# Patient Record
Sex: MALE | Race: BLACK OR AFRICAN AMERICAN | NOT HISPANIC OR LATINO | Employment: FULL TIME | URBAN - METROPOLITAN AREA
[De-identification: names, ages, dates, MRNs, and addresses within clinical notes are randomized per-mention and may not be internally consistent; named-entity substitution may affect disease eponyms.]

---

## 2024-04-01 ENCOUNTER — OFFICE VISIT (OUTPATIENT)
Dept: PRIMARY CARE | Facility: CLINIC | Age: 49
End: 2024-04-01
Payer: COMMERCIAL

## 2024-04-01 VITALS
WEIGHT: 175 LBS | HEIGHT: 67 IN | OXYGEN SATURATION: 97 % | SYSTOLIC BLOOD PRESSURE: 149 MMHG | DIASTOLIC BLOOD PRESSURE: 92 MMHG | RESPIRATION RATE: 12 BRPM | BODY MASS INDEX: 27.47 KG/M2 | HEART RATE: 71 BPM

## 2024-04-01 DIAGNOSIS — E78.00 ELEVATED LDL CHOLESTEROL LEVEL: ICD-10-CM

## 2024-04-01 DIAGNOSIS — E03.9 HYPOTHYROIDISM, UNSPECIFIED TYPE: Primary | ICD-10-CM

## 2024-04-01 DIAGNOSIS — K64.9 HEMORRHOIDS, UNSPECIFIED HEMORRHOID TYPE: ICD-10-CM

## 2024-04-01 DIAGNOSIS — Z00.00 ENCOUNTER FOR ANNUAL PHYSICAL EXAM: ICD-10-CM

## 2024-04-01 DIAGNOSIS — I10 BENIGN ESSENTIAL HYPERTENSION: ICD-10-CM

## 2024-04-01 DIAGNOSIS — E78.5 HYPERLIPIDEMIA, UNSPECIFIED HYPERLIPIDEMIA TYPE: ICD-10-CM

## 2024-04-01 PROBLEM — L29.0 ANAL ITCHING: Status: ACTIVE | Noted: 2024-04-01

## 2024-04-01 PROBLEM — B35.1 ONYCHOMYCOSIS OF TOENAIL: Status: ACTIVE | Noted: 2024-04-01

## 2024-04-01 PROBLEM — L73.0 ACNE KELOID: Status: ACTIVE | Noted: 2019-06-25

## 2024-04-01 PROBLEM — J31.0 RHINITIS: Status: ACTIVE | Noted: 2024-04-01

## 2024-04-01 PROBLEM — L21.8 OTHER SEBORRHEIC DERMATITIS: Status: ACTIVE | Noted: 2019-06-25

## 2024-04-01 PROBLEM — J03.90 TONSILLITIS: Status: ACTIVE | Noted: 2024-04-01

## 2024-04-01 PROBLEM — D50.9 IRON DEFICIENCY ANEMIA: Status: ACTIVE | Noted: 2024-04-01

## 2024-04-01 LAB
25(OH)D3 SERPL-MCNC: 12 NG/ML (ref 30–100)
ALBUMIN SERPL BCP-MCNC: 4.4 G/DL (ref 3.4–5)
ALP SERPL-CCNC: 45 U/L (ref 33–120)
ALT SERPL W P-5'-P-CCNC: 13 U/L (ref 10–52)
ANION GAP SERPL CALC-SCNC: 19 MMOL/L (ref 10–20)
AST SERPL W P-5'-P-CCNC: 25 U/L (ref 9–39)
BILIRUB SERPL-MCNC: 0.7 MG/DL (ref 0–1.2)
BUN SERPL-MCNC: 11 MG/DL (ref 6–23)
CALCIUM SERPL-MCNC: 9.6 MG/DL (ref 8.6–10.6)
CHLORIDE SERPL-SCNC: 99 MMOL/L (ref 98–107)
CHOLEST SERPL-MCNC: 235 MG/DL (ref 0–199)
CHOLESTEROL/HDL RATIO: 2.7
CO2 SERPL-SCNC: 25 MMOL/L (ref 21–32)
CREAT SERPL-MCNC: 0.99 MG/DL (ref 0.5–1.3)
EGFRCR SERPLBLD CKD-EPI 2021: >90 ML/MIN/1.73M*2
ERYTHROCYTE [DISTWIDTH] IN BLOOD BY AUTOMATED COUNT: 12.6 % (ref 11.5–14.5)
GLUCOSE SERPL-MCNC: 61 MG/DL (ref 74–99)
HCT VFR BLD AUTO: 54.8 % (ref 41–52)
HDLC SERPL-MCNC: 87.2 MG/DL
HGB BLD-MCNC: 16.7 G/DL (ref 13.5–17.5)
LDLC SERPL CALC-MCNC: 133 MG/DL
MCH RBC QN AUTO: 28.7 PG (ref 26–34)
MCHC RBC AUTO-ENTMCNC: 30.5 G/DL (ref 32–36)
MCV RBC AUTO: 94 FL (ref 80–100)
NON HDL CHOLESTEROL: 148 MG/DL (ref 0–149)
NRBC BLD-RTO: 0 /100 WBCS (ref 0–0)
PLATELET # BLD AUTO: 200 X10*3/UL (ref 150–450)
POTASSIUM SERPL-SCNC: 3.9 MMOL/L (ref 3.5–5.3)
PROT SERPL-MCNC: 7.4 G/DL (ref 6.4–8.2)
PSA SERPL-MCNC: 1.29 NG/ML
RBC # BLD AUTO: 5.81 X10*6/UL (ref 4.5–5.9)
SODIUM SERPL-SCNC: 139 MMOL/L (ref 136–145)
TRIGL SERPL-MCNC: 72 MG/DL (ref 0–149)
TSH SERPL-ACNC: 2.21 MIU/L (ref 0.44–3.98)
VLDL: 14 MG/DL (ref 0–40)
WBC # BLD AUTO: 7.1 X10*3/UL (ref 4.4–11.3)

## 2024-04-01 PROCEDURE — 82306 VITAMIN D 25 HYDROXY: CPT

## 2024-04-01 PROCEDURE — 3077F SYST BP >= 140 MM HG: CPT | Performed by: INTERNAL MEDICINE

## 2024-04-01 PROCEDURE — 84443 ASSAY THYROID STIM HORMONE: CPT

## 2024-04-01 PROCEDURE — 85027 COMPLETE CBC AUTOMATED: CPT

## 2024-04-01 PROCEDURE — 36415 COLL VENOUS BLD VENIPUNCTURE: CPT

## 2024-04-01 PROCEDURE — 80053 COMPREHEN METABOLIC PANEL: CPT

## 2024-04-01 PROCEDURE — 3080F DIAST BP >= 90 MM HG: CPT | Performed by: INTERNAL MEDICINE

## 2024-04-01 PROCEDURE — 1036F TOBACCO NON-USER: CPT | Performed by: INTERNAL MEDICINE

## 2024-04-01 PROCEDURE — 99204 OFFICE O/P NEW MOD 45 MIN: CPT | Performed by: INTERNAL MEDICINE

## 2024-04-01 PROCEDURE — 84153 ASSAY OF PSA TOTAL: CPT

## 2024-04-01 PROCEDURE — 80061 LIPID PANEL: CPT

## 2024-04-01 RX ORDER — HYDROCORTISONE 25 MG/G
CREAM TOPICAL 4 TIMES DAILY PRN
Qty: 30 G | Refills: 0 | Status: SHIPPED | OUTPATIENT
Start: 2024-04-01

## 2024-04-01 ASSESSMENT — ENCOUNTER SYMPTOMS
DEPRESSION: 0
OCCASIONAL FEELINGS OF UNSTEADINESS: 0
LOSS OF SENSATION IN FEET: 0

## 2024-04-01 NOTE — ASSESSMENT & PLAN NOTE
Pt endorses systolic BP in the 120's at home. Describes white coat hypertension. Will evaluate BP at next visit and create further plan from there.

## 2024-04-01 NOTE — PROGRESS NOTES
"Subjective   Chief complaint: Connor Landon is a 48 y.o. male who presents for New Patient Visit (NPV is here to establish care, pt c/o for a couple weeks now having sore rectum ).    HPI:  48 y.o. male here to establish care  Complains of tingling, itchy sensation in rectum. Denies blood, pain.  No history of any chronic illness.   Working out 3 times per week.   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             Objective   BP (!) 149/92   Pulse 71   Resp 12   Ht 1.702 m (5' 7\")   Wt 79.4 kg (175 lb)   SpO2 97%   BMI 27.41 kg/m²   Physical Exam  HENT:      Head: Normocephalic and atraumatic.      Mouth/Throat:      Mouth: Mucous membranes are moist.      Pharynx: Oropharynx is clear.   Eyes:      Pupils: Pupils are equal, round, and reactive to light.   Cardiovascular:      Rate and Rhythm: Normal rate and regular rhythm.      Pulses: Normal pulses.      Heart sounds: Normal heart sounds.   Pulmonary:      Effort: Pulmonary effort is normal. No respiratory distress.   Abdominal:      General: Abdomen is flat. There is no distension.      Tenderness: There is no abdominal tenderness.   Genitourinary:     Comments: Rectal exam performed with chaperone. Internal and external hemorrhoids identified. No erythema, pus, swelling.    Musculoskeletal:         General: Normal range of motion.      Cervical back: Normal range of motion.   Neurological:      General: No focal deficit present.      Mental Status: He is alert.         I have reviewed and reconciled the medication list with the patient today. No current outpatient medications on file.     Imaging:  No results found.     Labs reviewed:    Lab Results   Component Value Date    WBC 7.2 12/21/2020    HGB 15.8 12/21/2020    HCT 49.4 12/21/2020     12/21/2020    CHOL 227 (H) 12/21/2020    TRIG 94 12/21/2020    HDL 61.1 12/21/2020    ALT 11 12/21/2020    AST 25 12/21/2020     12/21/2020    K 4.1 12/21/2020    CL " 100 12/21/2020    CREATININE 1.13 12/21/2020    BUN 13 12/21/2020    CO2 29 12/21/2020       Assessment/Plan   Problem List Items Addressed This Visit          Cardiac and Vasculature    Hyperlipidemia     Lab obtained today for review at next appointment.          Relevant Orders    Lipid Panel    Benign essential hypertension     Pt endorses systolic BP in the 120's at home. Describes white coat hypertension. Will evaluate BP at next visit and create further plan from there.          Elevated LDL cholesterol level     Lab obtained today for review at next appointment.             Endocrine/Metabolic    Hypothyroidism - Primary     Lab obtained today for review at next appointment.             Gastrointestinal and Abdominal    Hemorrhoids     Anusol HC sent to pharmacy to be used three times per day for one week             Health Encounters    Encounter for annual physical exam     Revisit office for further establishment of care         Relevant Orders    CBC    Comprehensive Metabolic Panel    Lipid Panel    Thyroid Stimulating Hormone    Vitamin D 25-Hydroxy,Total (for eval of Vitamin D levels)    Prostate Specific Antigen, Screen       Follow up in 1-2 weeks for establishment of care including review of labs obtained today.

## 2024-04-11 ENCOUNTER — APPOINTMENT (OUTPATIENT)
Dept: PRIMARY CARE | Facility: CLINIC | Age: 49
End: 2024-04-11
Payer: COMMERCIAL

## 2024-04-24 ENCOUNTER — APPOINTMENT (OUTPATIENT)
Dept: PRIMARY CARE | Facility: CLINIC | Age: 49
End: 2024-04-24
Payer: COMMERCIAL

## 2025-02-19 ENCOUNTER — APPOINTMENT (OUTPATIENT)
Dept: UROLOGY | Facility: CLINIC | Age: 50
End: 2025-02-19
Payer: COMMERCIAL

## 2025-03-12 ENCOUNTER — APPOINTMENT (OUTPATIENT)
Dept: UROLOGY | Facility: CLINIC | Age: 50
End: 2025-03-12
Payer: COMMERCIAL

## 2025-03-30 NOTE — PROGRESS NOTES
History of Present Illness (HPI):  TODAY: (03/30/25)  Connor Landon is a 49 y.o. male with history of ***    Past Medical History:   Diagnosis Date    Contact with and (suspected) exposure to infections with a predominantly sexual mode of transmission 02/25/2014    Exposure to STD    Disorder of the skin and subcutaneous tissue, unspecified 09/06/2014    Skin lesion    Elevated blood-pressure reading, without diagnosis of hypertension 12/11/2015    Blood pressure elevated without history of HTN    Encounter for screening for infections with a predominantly sexual mode of transmission 01/23/2016    Screen for STD (sexually transmitted disease)    Personal history of other diseases of male genital organs 12/11/2015    History of prostatitis    Unspecified injury of unspecified wrist, hand and finger(s), initial encounter 12/16/2014    Hand injury     No past surgical history on file.  Family History   Problem Relation Name Age of Onset    Diabetes Father      Heart disease Brother       Social History     Tobacco Use   Smoking Status Former    Types: Cigarettes   Smokeless Tobacco Never     Current Outpatient Medications   Medication Sig Dispense Refill    hydrocortisone (Anusol-HC) 2.5 % rectal cream Insert into the rectum 4 times a day as needed for hemorrhoids (rectal discomfort). Apply to affected areas 30 g 0     No current facility-administered medications for this visit.     No Known Allergies  Past medical, surgical, family and social history in the chart was reviewed and is accurate including any additions to what is in this HPI.    Review of systems (ROS):   Pertinent information as listed in the HPI.      Objective   There were no vitals taken for this visit.  Physical Exam:  Constitutional: NAD  HEENT: AT/NC  Resp: Non labored respirations.  Skin: No jaundice or visible skin lesions.  Neuro: No focal deficits.  Psych: Appropriate mood and affect.    Lab Review:  Lab Results   Component Value Date    WBC  "7.1 04/01/2024    RBC 5.81 04/01/2024    HGB 16.7 04/01/2024    HCT 54.8 (H) 04/01/2024     04/01/2024      Lab Results   Component Value Date    BUN 11 04/01/2024    CREATININE 0.99 04/01/2024      No results found for: \"PSA\", \"HGBA1C\"  Lab Results   Component Value Date    CHOL 235 (H) 04/01/2024    TRIG 72 04/01/2024    HDL 87.2 04/01/2024    ALT 13 04/01/2024    AST 25 04/01/2024     04/01/2024    K 3.9 04/01/2024    CL 99 04/01/2024    CO2 25 04/01/2024    TSH 2.21 04/01/2024        ASSESSMENT:  Problem List Items Addressed This Visit    None     PLAN:  ***    All questions were answered to the patient’s satisfaction.  Patient agrees with the plan and wishes to proceed.  Continue follow-up for ongoing care of *** chronic medical conditions.    Scribed for Dr. Lexii Boogie by Kaylyn Nina.  I, Dr. Lexii Boogie, have personally reviewed and agreed with the information entered by the Virtual Scribe. 03/30/25   " inhibitor use should he seek urgent medical attention for any reason.     I explained the common adverse effects of therapy including but not limited to headache, flushing, dizziness, rash, upset stomach, diarrhea, nasal congestion, abnormal vision, back pain, and myalgia. Less common side effects are lightheadedness or blood pressure drop upon standing. We discussed that patients have  after using medications in this class, and sometimes the exact cause of death was not able to be determined. There is a very small risk of nonarteritic ischemic optic neuropathy, a rare cause of blindness that may be permanent while using medications in this class. Patient is instructed to immediately stop this medication and seek medical attention if he develops visual disturbances in one or both eyes.     We discussed that if he experiences erection lasting longer than four hours, he needs to seek immediate treatment at the ER as the longer he waits, the more damage is done to the penile tissue. The patient states that he is not withholding any information about his condition or the use of medications in order to receive a PDE5 inhibitor class medication. No barriers to learning were identified. After all of the patients' questions were satisfactorily answered, he expressed understanding the risks of therapy and wishes to proceed.     All side-effects were discussed.  He would like to trial this.  Rx provided.   Refer to Dr. Vasquez    All questions were answered to the patient’s satisfaction.  Patient agrees with the plan and wishes to proceed.    Scribed for Dr. Lexii Boogie by Kaylyn Nina.  I, Dr. Lexii Boogie, have personally reviewed and agreed with the information entered by the Virtual Scribe. 25

## 2025-03-31 ENCOUNTER — APPOINTMENT (OUTPATIENT)
Dept: UROLOGY | Facility: CLINIC | Age: 50
End: 2025-03-31
Payer: COMMERCIAL

## 2025-03-31 VITALS — BODY MASS INDEX: 27.41 KG/M2 | TEMPERATURE: 97.7 F | HEIGHT: 67 IN

## 2025-03-31 DIAGNOSIS — Z13.6 ENCOUNTER FOR LIPID SCREENING FOR CARDIOVASCULAR DISEASE: ICD-10-CM

## 2025-03-31 DIAGNOSIS — Z13.1 SCREENING FOR DIABETES MELLITUS (DM): ICD-10-CM

## 2025-03-31 DIAGNOSIS — Z13.220 ENCOUNTER FOR LIPID SCREENING FOR CARDIOVASCULAR DISEASE: ICD-10-CM

## 2025-03-31 DIAGNOSIS — N52.9 ERECTILE DYSFUNCTION, UNSPECIFIED ERECTILE DYSFUNCTION TYPE: Primary | ICD-10-CM

## 2025-03-31 DIAGNOSIS — Z00.00 HEALTH MAINTENANCE EXAMINATION: ICD-10-CM

## 2025-03-31 LAB
POC APPEARANCE, URINE: CLEAR
POC BILIRUBIN, URINE: NEGATIVE
POC BLOOD, URINE: NEGATIVE
POC COLOR, URINE: YELLOW
POC GLUCOSE, URINE: NEGATIVE MG/DL
POC KETONES, URINE: NEGATIVE MG/DL
POC LEUKOCYTES, URINE: NEGATIVE
POC NITRITE,URINE: NEGATIVE
POC PH, URINE: 7 PH
POC PROTEIN, URINE: NEGATIVE MG/DL
POC SPECIFIC GRAVITY, URINE: 1.02
POC UROBILINOGEN, URINE: 0.2 EU/DL

## 2025-03-31 PROCEDURE — 99204 OFFICE O/P NEW MOD 45 MIN: CPT | Performed by: UROLOGY

## 2025-03-31 PROCEDURE — 1036F TOBACCO NON-USER: CPT | Performed by: UROLOGY

## 2025-03-31 PROCEDURE — 81003 URINALYSIS AUTO W/O SCOPE: CPT | Performed by: UROLOGY

## 2025-03-31 RX ORDER — TADALAFIL 10 MG/1
10 TABLET ORAL DAILY PRN
Qty: 30 TABLET | Refills: 6 | Status: SHIPPED | OUTPATIENT
Start: 2025-03-31 | End: 2026-03-31

## 2025-03-31 ASSESSMENT — PAIN SCALES - GENERAL: PAINLEVEL_OUTOF10: 0-NO PAIN

## 2025-04-15 ENCOUNTER — APPOINTMENT (OUTPATIENT)
Dept: UROLOGY | Facility: CLINIC | Age: 50
End: 2025-04-15
Payer: COMMERCIAL

## 2025-04-15 DIAGNOSIS — N52.9 ERECTILE DYSFUNCTION, UNSPECIFIED ERECTILE DYSFUNCTION TYPE: ICD-10-CM

## 2025-04-15 DIAGNOSIS — F43.22 ADJUSTMENT DISORDER WITH ANXIOUS MOOD: Primary | ICD-10-CM

## 2025-04-15 PROCEDURE — 90791 PSYCH DIAGNOSTIC EVALUATION: CPT | Performed by: PSYCHOLOGIST

## 2025-04-24 PROBLEM — N52.9 ERECTILE DYSFUNCTION: Status: ACTIVE | Noted: 2025-04-24

## 2025-04-24 PROBLEM — F43.22 ADJUSTMENT DISORDER WITH ANXIOUS MOOD: Status: ACTIVE | Noted: 2025-04-24

## 2025-04-24 NOTE — PROGRESS NOTES
"Outpatient Psychology Initial Appointment  Subjective   Connor Landon, a 49 y.o. male, for initial evaluation visit. Participated in diagnostic interview and identification of goals for treatment.      Patient is referred by Lexii Boogie MD.      Reason:  He has been experiencing distress due to difficulties in his sexual relationship.      Psychosocial History Connor is currently seeking psychological/sex therapy under the recommendation of his urologist.  Connor states that he has been having difficulties with erectile functioning, often experiencing a \"partial erection\" during partnered sexual engagement. He was given a prescription of Tadalafil at 10mg prn.  He has considered that there are psychological factors to his difficulties.  Connor states that he has been in a newer relationship of 2 years, having been  previously and now a . His wife passed away in 2021 after being together for 20 years and  for 17 of those years.  He stated that they have three children, ages 31, 22, and 20 years of age.  He identified her as a \"good partner\", and that he went through a period of grief and loss following her death.  During her illness he went through a couple year period of no sex due to her illness, and over that time he learned to be non-sexual.  In his current partnered relationship he states that there were a couple of incidents where he was unable to engage sexually, which reportedly causes some anxiety at the onset of any sexual initiation and \"getting in my head\" regarding performance.  He identified that the relationship is good, identifying that they have an overall good connection.  They have lived together for 10 months, and he worries some and does not want to \"mess up the relationship\".  He acknowledges that she comes with her own history and has \"dealt with stuff\" in previous relationships as well.  She reportedly provides patience and understanding regarding the sexual difficulties.  " They  currently have a frequency of one time per week.      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      Connor stated that his goals are to address the difficulties in his sexual relationship.     Mental Status Evaluation:  Appearance:  age appropriate   Behavior:  normal   Speech:  normal pitch and normal volume   Mood:  normal   Affect:  normal   Thought Process:  normal   Thought Content:  normal   Sensorium:  person, place, time/date, situation, day of week, month of year, and year   Cognition:  grossly intact   Insight:  Intact, as evidenced by ability to draw insights into current functioning and past experience.          Assessment/Plan     Diagnosis: Problem List[1]    Treatment Goals:  Specify outcomes written in observable, behavioral terms:   Anxiety: eliminating avoidance (specify sexual engagement), modifying schemata of threat/vulnerability/need for control (or other schemata -- specify sexual scripting), and reducing physical symptoms of anxiety  Improve sexual response and functioning; Improve sexual satisfaction.     Treatment Plan/Recommendations: Provided psychoeducation/psychosexual education regarding the overlay of mental health and sexual health as it pertains to patient's presenting concerns.    Teach Cognitive and Behavioral strategies for mood/anxiety management.    Teach Cognitive and Behavioral strategies for improved sexual response and functioning.    Teach intimacy skills towards improved sexual satisfaction.       Review with patient: Treatment plan reviewed with the patient.    Darrius Vasquez PsyD         [1]   Patient Active Problem List  Diagnosis    Hyperlipidemia    Onychomycosis of toenail    Other seborrheic dermatitis    Rhinitis    Tonsillitis    Acne keloid    Anal itching    Hypothyroidism    Iron deficiency anemia    Benign  essential hypertension    Elevated LDL cholesterol level    Encounter for annual physical exam    Hemorrhoids    Adjustment disorder with anxious mood    Erectile dysfunction

## 2025-04-30 ENCOUNTER — APPOINTMENT (OUTPATIENT)
Dept: UROLOGY | Facility: CLINIC | Age: 50
End: 2025-04-30
Payer: COMMERCIAL

## 2025-05-13 ENCOUNTER — APPOINTMENT (OUTPATIENT)
Dept: UROLOGY | Facility: CLINIC | Age: 50
End: 2025-05-13
Payer: COMMERCIAL

## 2025-07-07 ENCOUNTER — OFFICE VISIT (OUTPATIENT)
Dept: URGENT CARE | Age: 50
End: 2025-07-07
Payer: COMMERCIAL

## 2025-07-07 VITALS
OXYGEN SATURATION: 96 % | DIASTOLIC BLOOD PRESSURE: 82 MMHG | RESPIRATION RATE: 16 BRPM | TEMPERATURE: 98.2 F | SYSTOLIC BLOOD PRESSURE: 158 MMHG | HEART RATE: 88 BPM

## 2025-07-07 DIAGNOSIS — R31.9 HEMATURIA, UNSPECIFIED TYPE: ICD-10-CM

## 2025-07-07 DIAGNOSIS — N30.01 ACUTE CYSTITIS WITH HEMATURIA: Primary | ICD-10-CM

## 2025-07-07 LAB
POC APPEARANCE, URINE: CLEAR
POC BILIRUBIN, URINE: NEGATIVE
POC BLOOD, URINE: ABNORMAL
POC COLOR, URINE: ABNORMAL
POC GLUCOSE, URINE: NEGATIVE MG/DL
POC KETONES, URINE: NEGATIVE MG/DL
POC LEUKOCYTES, URINE: ABNORMAL
POC NITRITE,URINE: NEGATIVE
POC PH, URINE: 6 PH
POC PROTEIN, URINE: ABNORMAL MG/DL
POC SPECIFIC GRAVITY, URINE: 1.01
POC UROBILINOGEN, URINE: 0.2 EU/DL

## 2025-07-07 RX ORDER — SULFAMETHOXAZOLE AND TRIMETHOPRIM 800; 160 MG/1; MG/1
1 TABLET ORAL 2 TIMES DAILY
Qty: 14 TABLET | Refills: 0 | Status: SHIPPED | OUTPATIENT
Start: 2025-07-07 | End: 2025-07-14

## 2025-07-07 RX ORDER — CLOBETASOL PROPIONATE 0.5 MG/ML
SOLUTION TOPICAL
COMMUNITY
Start: 2025-07-06

## 2025-07-07 RX ORDER — KETOCONAZOLE 20 MG/ML
SHAMPOO, SUSPENSION TOPICAL
COMMUNITY
Start: 2025-07-06

## 2025-07-07 NOTE — LETTER
July 7, 2025     Patient: Connor Landon   YOB: 1975   Date of Visit: 7/7/2025       To Whom It May Concern:    Connor Landon was seen in my clinic on 7/7/2025 at 10:25 am. Please excuse Connor for his absence from work on this day to make the appointment.    If you have any questions or concerns, please don't hesitate to call.         Sincerely,         Araceli Hernandez PA-C        CC: No Recipients

## 2025-07-07 NOTE — PROGRESS NOTES
Subjective:  Connor Landon is a 49 y.o. male who presents today complaining of urinary symptoms. He complains of dysuria and hematuria that started 1 day ago. Denies back pain, fever, n/v, diarrhea, severe abdominal pain, or penile discharge/rashes. Has been taking Ibuprofen with minimal relief. Still with good po intake and activity. Has + hx of prostatitis about 15 years ago, but this feels different. Had more abdominal pain and rectal pain at that time. He does not have any concern for STDs. Mentions he had anal intercourse with his partner recently.    Review of Systems:  Pertinent items are noted in HPI.      Past Medical History:  Medical History[1]      Allergies:  Allergies[2]        Objective:  Vitals:    07/07/25 1104   BP: 158/82   BP Location: Right arm   Patient Position: Sitting   Pulse: 88   Resp: 16   Temp: 36.8 °C (98.2 °F)   SpO2: 96%       General Appearance: Alert and oriented. NAD. Appears comfortable.   Lungs: No SOB or labored breathing  Heart: NSR  Abdomen: Soft, no TTP. No rebound, guarding. No palpable hepatosplenomegaly.  Back: no CVA tenderness bilaterally  Neuro: Normal speech, normal gait. Grossly neurologically intact      Labs:  Results for orders placed or performed in visit on 07/07/25   POCT UA Automated manually resulted    Collection Time: 07/07/25 11:08 AM   Result Value Ref Range    POC Color, Urine Rajwinder (A) Straw, Yellow, Light-Yellow    POC Appearance, Urine Clear Clear    POC Glucose, Urine NEGATIVE NEGATIVE mg/dl    POC Bilirubin, Urine NEGATIVE NEGATIVE    POC Ketones, Urine NEGATIVE NEGATIVE mg/dl    POC Specific Gravity, Urine 1.015 1.005 - 1.035    POC Blood, Urine LARGE (3+) (A) NEGATIVE    POC PH, Urine 6.0 No Reference Range Established PH    POC Protein, Urine 15 (1+) (A) NEGATIVE mg/dl    POC Urobilinogen, Urine 0.2 0.2, 1.0 EU/DL    Poc Nitrite, Urine NEGATIVE NEGATIVE    POC Leukocytes, Urine LARGE (3+) (A) NEGATIVE           Assessment:  1. Acute cystitis with  hematuria  POCT UA Automated manually resulted    Urine Culture    sulfamethoxazole-trimethoprim (Bactrim DS) 800-160 mg tablet      2. Hematuria, unspecified type  POCT UA Automated manually resulted    Urine Culture    sulfamethoxazole-trimethoprim (Bactrim DS) 800-160 mg tablet            Plan:  New Prescriptions    SULFAMETHOXAZOLE-TRIMETHOPRIM (BACTRIM DS) 800-160 MG TABLET    Take 1 tablet by mouth 2 times a day for 7 days.       Pt here with complaints of dysuria and hematuria x 1 day  He is well appearing, afebrile  POCT urine with large blood and large leuks  Urine culture pending  Suspect UTI  He has no concern for STDs today, so will defer testing  Low suspicion for prostatitis as there is no fever or rectal pain  Low suspicion for pyelo as there is no CVA tenderness, nausea or vomiting  Will start Bactrim  Fluids  Tylenol or Ibuprofen as needed for aches, pain  Close PCP follow up  ED precautions discussed (worsening symptoms, fever, back pain, vomiting)         Araceli Hernandez PA-C        I havegiven the patient instructions regarding his diagnosis, expectations, follow up, and return to the ER precautions. I explained to the patient that emergent conditions may arise to return to the immediate care or ER fornew, worsening or any persistent conditions. I've explained the importance of following up with PCP as instructed. The patient verbalized understanding of the discharge instructions and plan.         [1]   Past Medical History:  Diagnosis Date    Contact with and (suspected) exposure to infections with a predominantly sexual mode of transmission 02/25/2014    Exposure to STD    Disorder of the skin and subcutaneous tissue, unspecified 09/06/2014    Skin lesion    Elevated blood-pressure reading, without diagnosis of hypertension 12/11/2015    Blood pressure elevated without history of HTN    Encounter for screening for infections with a predominantly sexual mode of transmission 01/23/2016    Screen for  STD (sexually transmitted disease)    Personal history of other diseases of male genital organs 12/11/2015    History of prostatitis    Unspecified injury of unspecified wrist, hand and finger(s), initial encounter 12/16/2014    Hand injury   [2] No Known Allergies

## 2025-07-10 LAB — BACTERIA UR CULT: ABNORMAL
